# Patient Record
Sex: MALE | Race: WHITE | URBAN - METROPOLITAN AREA
[De-identification: names, ages, dates, MRNs, and addresses within clinical notes are randomized per-mention and may not be internally consistent; named-entity substitution may affect disease eponyms.]

---

## 2019-08-05 ENCOUNTER — APPOINTMENT (RX ONLY)
Dept: URBAN - METROPOLITAN AREA CLINIC 142 | Facility: CLINIC | Age: 79
Setting detail: DERMATOLOGY
End: 2019-08-05

## 2019-08-05 DIAGNOSIS — L57.0 ACTINIC KERATOSIS: ICD-10-CM

## 2019-08-05 DIAGNOSIS — Z85.820 PERSONAL HISTORY OF MALIGNANT MELANOMA OF SKIN: ICD-10-CM

## 2019-08-05 DIAGNOSIS — L81.4 OTHER MELANIN HYPERPIGMENTATION: ICD-10-CM

## 2019-08-05 DIAGNOSIS — D22 MELANOCYTIC NEVI: ICD-10-CM

## 2019-08-05 DIAGNOSIS — D18.0 HEMANGIOMA: ICD-10-CM

## 2019-08-05 DIAGNOSIS — L72.8 OTHER FOLLICULAR CYSTS OF THE SKIN AND SUBCUTANEOUS TISSUE: ICD-10-CM

## 2019-08-05 DIAGNOSIS — L82.1 OTHER SEBORRHEIC KERATOSIS: ICD-10-CM

## 2019-08-05 DIAGNOSIS — Z80.8 FAMILY HISTORY OF MALIGNANT NEOPLASM OF OTHER ORGANS OR SYSTEMS: ICD-10-CM

## 2019-08-05 PROBLEM — D22.5 MELANOCYTIC NEVI OF TRUNK: Status: ACTIVE | Noted: 2019-08-05

## 2019-08-05 PROBLEM — D22.62 MELANOCYTIC NEVI OF LEFT UPPER LIMB, INCLUDING SHOULDER: Status: ACTIVE | Noted: 2019-08-05

## 2019-08-05 PROBLEM — D18.01 HEMANGIOMA OF SKIN AND SUBCUTANEOUS TISSUE: Status: ACTIVE | Noted: 2019-08-05

## 2019-08-05 PROBLEM — F41.9 ANXIETY DISORDER, UNSPECIFIED: Status: ACTIVE | Noted: 2019-08-05

## 2019-08-05 PROBLEM — D22.61 MELANOCYTIC NEVI OF RIGHT UPPER LIMB, INCLUDING SHOULDER: Status: ACTIVE | Noted: 2019-08-05

## 2019-08-05 PROBLEM — F32.9 MAJOR DEPRESSIVE DISORDER, SINGLE EPISODE, UNSPECIFIED: Status: ACTIVE | Noted: 2019-08-05

## 2019-08-05 PROCEDURE — 17003 DESTRUCT PREMALG LES 2-14: CPT

## 2019-08-05 PROCEDURE — 99203 OFFICE O/P NEW LOW 30 MIN: CPT | Mod: 25

## 2019-08-05 PROCEDURE — 17000 DESTRUCT PREMALG LESION: CPT

## 2019-08-05 PROCEDURE — ? COUNSELING

## 2019-08-05 PROCEDURE — ? DEFER

## 2019-08-05 PROCEDURE — ? LIQUID NITROGEN

## 2019-08-05 ASSESSMENT — LOCATION SIMPLE DESCRIPTION DERM
LOCATION SIMPLE: LEFT FOREARM
LOCATION SIMPLE: RIGHT CHEEK
LOCATION SIMPLE: RIGHT EAR
LOCATION SIMPLE: LEFT CHEEK
LOCATION SIMPLE: RIGHT UPPER ARM
LOCATION SIMPLE: SCALP
LOCATION SIMPLE: RIGHT UPPER BACK
LOCATION SIMPLE: LEFT UPPER ARM
LOCATION SIMPLE: LEFT EAR
LOCATION SIMPLE: ABDOMEN
LOCATION SIMPLE: LEFT ANTERIOR NECK
LOCATION SIMPLE: LEFT UPPER BACK
LOCATION SIMPLE: CHEST
LOCATION SIMPLE: LEFT SHOULDER

## 2019-08-05 ASSESSMENT — LOCATION DETAILED DESCRIPTION DERM
LOCATION DETAILED: LEFT POSTERIOR SHOULDER
LOCATION DETAILED: LEFT SUPERIOR UPPER BACK
LOCATION DETAILED: LEFT SUPERIOR CRUS OF ANTIHELIX
LOCATION DETAILED: RIGHT CENTRAL MALAR CHEEK
LOCATION DETAILED: RIGHT MID-UPPER BACK
LOCATION DETAILED: LEFT LATERAL ABDOMEN
LOCATION DETAILED: LEFT INFERIOR CENTRAL MALAR CHEEK
LOCATION DETAILED: LEFT SUPERIOR PARIETAL SCALP
LOCATION DETAILED: RIGHT LATERAL INFERIOR CHEST
LOCATION DETAILED: RIGHT PROXIMAL POSTERIOR UPPER ARM
LOCATION DETAILED: LEFT MEDIAL SUPERIOR CHEST
LOCATION DETAILED: LEFT SUPERIOR LATERAL UPPER BACK
LOCATION DETAILED: LEFT SUPERIOR ANTERIOR NECK
LOCATION DETAILED: LEFT VENTRAL LATERAL DISTAL FOREARM
LOCATION DETAILED: RIGHT SUPERIOR CRUS OF ANTIHELIX
LOCATION DETAILED: LEFT DISTAL POSTERIOR UPPER ARM

## 2019-08-05 ASSESSMENT — LOCATION ZONE DERM
LOCATION ZONE: FACE
LOCATION ZONE: NECK
LOCATION ZONE: ARM
LOCATION ZONE: EAR
LOCATION ZONE: SCALP
LOCATION ZONE: TRUNK

## 2019-08-05 ASSESSMENT — PAIN INTENSITY VAS: HOW INTENSE IS YOUR PAIN 0 BEING NO PAIN, 10 BEING THE MOST SEVERE PAIN POSSIBLE?: NO PAIN

## 2019-08-05 NOTE — PROCEDURE: LIQUID NITROGEN
Post-Care Instructions: I reviewed with the patient in detail post-care instructions. Patient is to wear sunprotection, and avoid picking at any of the treated lesions. Pt may apply Vaseline to crusted or scabbing areas.
Consent: The patient's verbal consent was obtained including but not limited to risks of crusting, scabbing, blistering, scarring, darker or lighter pigmentary change, recurrence, incomplete removal and infection.
Detail Level: Zone
Render Post-Care Instructions In Note?: yes
Total Number Of Aks Treated: 14
Duration Of Freeze Thaw-Cycle (Seconds): 15
Number Of Freeze-Thaw Cycles: 1 freeze-thaw cycle
Render In Bullet Format When Appropriate: No

## 2022-01-21 ENCOUNTER — HOSPITAL ENCOUNTER (INPATIENT)
Dept: HOSPITAL 96 - M.ERS | Age: 82
LOS: 1 days | Discharge: HOME | DRG: 69 | End: 2022-01-22
Attending: INTERNAL MEDICINE | Admitting: INTERNAL MEDICINE
Payer: COMMERCIAL

## 2022-01-21 VITALS — SYSTOLIC BLOOD PRESSURE: 189 MMHG | DIASTOLIC BLOOD PRESSURE: 114 MMHG

## 2022-01-21 VITALS — HEIGHT: 75 IN | BODY MASS INDEX: 28.7 KG/M2 | WEIGHT: 230.8 LBS

## 2022-01-21 VITALS — SYSTOLIC BLOOD PRESSURE: 184 MMHG | DIASTOLIC BLOOD PRESSURE: 114 MMHG

## 2022-01-21 VITALS — SYSTOLIC BLOOD PRESSURE: 157 MMHG | DIASTOLIC BLOOD PRESSURE: 95 MMHG

## 2022-01-21 VITALS — DIASTOLIC BLOOD PRESSURE: 107 MMHG | SYSTOLIC BLOOD PRESSURE: 178 MMHG

## 2022-01-21 DIAGNOSIS — Z88.0: ICD-10-CM

## 2022-01-21 DIAGNOSIS — R29.810: ICD-10-CM

## 2022-01-21 DIAGNOSIS — Z79.01: ICD-10-CM

## 2022-01-21 DIAGNOSIS — I48.91: ICD-10-CM

## 2022-01-21 DIAGNOSIS — Z88.6: ICD-10-CM

## 2022-01-21 DIAGNOSIS — Z91.02: ICD-10-CM

## 2022-01-21 DIAGNOSIS — E44.1: ICD-10-CM

## 2022-01-21 DIAGNOSIS — G20: ICD-10-CM

## 2022-01-21 DIAGNOSIS — Z88.8: ICD-10-CM

## 2022-01-21 DIAGNOSIS — I95.1: ICD-10-CM

## 2022-01-21 DIAGNOSIS — Z20.822: ICD-10-CM

## 2022-01-21 DIAGNOSIS — Z66: ICD-10-CM

## 2022-01-21 DIAGNOSIS — Z95.0: ICD-10-CM

## 2022-01-21 DIAGNOSIS — Z90.49: ICD-10-CM

## 2022-01-21 DIAGNOSIS — G45.9: Primary | ICD-10-CM

## 2022-01-21 DIAGNOSIS — G90.9: ICD-10-CM

## 2022-01-21 LAB
ABSOLUTE BASOPHILS: 0 THOU/UL (ref 0–0.2)
ABSOLUTE EOSINOPHILS: 0 THOU/UL (ref 0–0.7)
ABSOLUTE MONOCYTES: 0.5 THOU/UL (ref 0–1.2)
ALBUMIN SERPL-MCNC: 3.1 G/DL (ref 3.4–5)
ALP SERPL-CCNC: 67 U/L (ref 46–116)
ALT SERPL-CCNC: 6 U/L (ref 30–65)
ANION GAP SERPL CALC-SCNC: 7 MMOL/L (ref 7–16)
AST SERPL-CCNC: 12 U/L (ref 15–37)
BASOPHILS NFR BLD AUTO: 0.8 %
BILIRUB SERPL-MCNC: 0.7 MG/DL
BUN SERPL-MCNC: 21 MG/DL (ref 7–18)
CALCIUM SERPL-MCNC: 8.6 MG/DL (ref 8.5–10.1)
CHLORIDE SERPL-SCNC: 108 MMOL/L (ref 98–107)
CO2 SERPL-SCNC: 28 MMOL/L (ref 21–32)
CREAT SERPL-MCNC: 1.2 MG/DL (ref 0.6–1.3)
EOSINOPHIL NFR BLD: 0.8 %
GLUCOSE SERPL-MCNC: 77 MG/DL (ref 70–99)
GRANULOCYTES NFR BLD MANUAL: 61.9 %
HCT VFR BLD CALC: 41.6 % (ref 42–52)
HGB BLD-MCNC: 13.5 GM/DL (ref 14–18)
INR PPP: 1.1
LYMPHOCYTES # BLD: 1.3 THOU/UL (ref 0.8–5.3)
LYMPHOCYTES NFR BLD AUTO: 26.2 %
MCH RBC QN AUTO: 30.2 PG (ref 26–34)
MCHC RBC AUTO-ENTMCNC: 32.5 G/DL (ref 28–37)
MCV RBC: 92.9 FL (ref 80–100)
MONOCYTES NFR BLD: 10.3 %
MPV: 6.9 FL. (ref 7.2–11.1)
NEUTROPHILS # BLD: 3.1 THOU/UL (ref 1.6–8.1)
NUCLEATED RBCS: 0 /100WBC
PLATELET COUNT*: 169 THOU/UL (ref 150–400)
POTASSIUM SERPL-SCNC: 4.3 MMOL/L (ref 3.5–5.1)
PROT SERPL-MCNC: 6.3 G/DL (ref 6.4–8.2)
PROTHROMBIN TIME: 10.9 SECONDS (ref 9.2–11.5)
RBC # BLD AUTO: 4.47 MIL/UL (ref 4.5–6)
RDW-CV: 14.3 % (ref 10.5–14.5)
SODIUM SERPL-SCNC: 143 MMOL/L (ref 136–145)
WBC # BLD AUTO: 5 THOU/UL (ref 4–11)

## 2022-01-21 NOTE — EKG
[place] [address]
Phone:  [phone]                     ELECTROCARDIOGRAM REPORT      
_______________________________________________________________________________
 
Name:         MERCEDEZ ARIAS            Room:                     PRE ER 
M.R.#:    [id number]     Account #:     [id number]  
Admission:                Attend Phys:                     
Discharge:                Date of Birth: [birth year]  
Date of Service: 22 0958  Report #:      [id number]
        [id number]
_______________________________________________________________________________
THIS REPORT FOR:  //name//                      
 
                         [facility] ED
                                       
Test Date:    2022               Test Time:    09:58:30
Pat Name:     MERCEDEZ ARIAS         Department:   
Patient ID:   [id number]            Room:          
Gender:                               Technician:   
:          1940               Requested By: Napoleon Louis
Order Number: [id number] MD:   Elia Newman
                                 Measurements
Intervals                              Axis          
Rate:         75                       P:            
MS:                                    QRS:          69
QRSD:         113                      T:            -22
QT:           426                                    
QTc:          476                                    
                           Interpretive Statements
Afib/flut and V-paced complexes
 
Anteroseptal infarct, age indeterminate
No previous ECG available for comparison
Electronically Signed On 2022 10:06:46 CST by Elia Newman
[email or web address]
 
 
 
 
 
 
 
 
 
 
 
 
 
 
 
 
 
 
 
 
  <ELECTRONICALLY SIGNED>
                                           By: Elia Newman MD, [facility]      
  22     1006
D: 22   _____________________________________
T: 22   Elia Newman MD, [facility]        /EPI

## 2022-01-21 NOTE — CON
[address]  [address]                    CONSULTATION                  
_______________________________________________________________________________
 
Name:       MERCEDEZ ARIAS             Room:           [address] [place]  
M.R.#:  [id number]      Account #:      [id number]  
Admission:  01/21/22     Attend Phys:    Kole Donaldson MD 
Discharge:               Date of Birth:  [birth year]  
         Report #: [id number]
                                                                     [id number]
_______________________________________________________________________________
THIS REPORT FOR:  
 
cc:  Tammie Yepez MD, Katrina MD Khosla, Parveen K. MD                                              ~
 
 
DATE OF CONSULTATION: 01/21/2022
 
HISTORY OF PRESENT ILLNESS:  This is an 81-year-old male patient who was seen by
[facility] earlier today for some facial weakness, which was noticed on 
the right side.  The patient said he is not having any more symptoms.  I 
reviewed those notes and it looks like they had recommended CT angio of the head
and neck and MRI.  Neither one of them was done, I am not sure why it was not 
done, I need to find out.  The patient said he is feeling back to his baseline. 
He in fact wanted to go back and wanted to know when he can go back.  
Apparently, there are some questions of hypotension, which may have been noted, 
but it is not documented.  Blood sugar was okay when it happened.
 
REVIEW OF SYSTEMS:  Indicates he has a history of AFib.  He is on 
anticoagulation.  There is some question of Parkinson disease and he is on 
carbidopa/levodopa.  I asked him about depression.  He denies it, but he is on 
Lexapro.  History is somewhat confusing.  He tells me he usually uses either a 
walker or a wheelchair.  I asked him why does he do that, he said he has 
multiple problems and he has done it for a long time.  This is a relevant 
14-point review of systems I can get.
 
PAST MEDICAL HISTORY:  Positive for Parkinson disease and ambulation difficulty.
 
FAMILY HISTORY:  Unremarkable.
 
SOCIAL HISTORY:  He says he does not drink alcohol or smoke.
 
PHYSICAL EXAMINATION:
NEUROLOGIC:  He talks softly, but he was able to tell me what month and what 
date it is and what hospital he is in.  His speech otherwise looks intact.  
Cranial nerve examination, there is some question of right facial weakness.  He 
moved all 4 extremities.  He is weak in the lower extremities and to some extent
in the upper extremity.  He says he can feel in the lower extremities.  The 
reflexes are somewhat diminished.  Plantars are mute.  There is no carotid bruit
in this patient.
CARDIORESPIRATORY:  Examination appear unremarkable.
HEENT:  His hearing and vision looks adequate.
VITAL SIGNS:  Blood pressure is 184/114, respirations 18, pulse is 77, 
temperature is 98.5.
 
 
 
 
[place] [address]                    CONSULTATION                  
_______________________________________________________________________________
 
Name:       MERCEDEZ ARIAS             Room:           [address] [place]  
[facility]#:  [id number]      Account #:      [id number]  
Admission:  01/21/22     Attend Phys:    Kole Donaldson MD 
Discharge:               Date of Birth:  [birth year]  
         Report #: [id number]
                                                                     [id number]
_______________________________________________________________________________
 
 
LABORATORY DATA:  Indicate white count is 5.  Sodium is 143.
 
IMPRESSION AND PLAN:  This patient with a chronic problem, was already seen by 
neurologist.  I do not know why CT angiogram was not done or whether it was 
done, the report is not here.  I will try to find out.  He has a pacemaker.  He 
does not know whether it is compatible with MRI or not, so I do not know MRI can
be done.  That may not change much treatment in this patient because he is 
already on Eliquis, but he will need some imaging study of the blood vessels.  
His GFR is good and the best I can tell, he is not allergic to dye, but he is 
allergic to bunch of other things.  We will try to find out and if there is a 
problem, we will get a carotid Doppler.  I spent more than 50 minutes of time 
taking care of this patient today and majority was spent counseling and 
coordinating.
 
 
 
 
 
 
 
 
 
 
 
 
 
 
 
 
 
 
 
 
 
 
 
 
 
 
 
 
 
                       
                                        By:                                
                 
D: 01/21/22 1956_______________________________________
T: 01/21/22 2032Pbarby Ruff MD            /nt

## 2022-01-21 NOTE — NUR
PER NURSE AT [facility] PT WAS IN BED ALL DAY YESTERDAY C/O OF WEAKNESS.
WHEN PT WOULD GET UP HIS B/P WOULD DROP. PT THIS AM GOT UP FOR BREAKFAST AND
HAS NOT STROKE SYMPTOMS. AT 0900 THE NURSE WENT INTO THE PT ROOM AND SHE
NOTICED FACIAL DROOPING. DR. LUTZ NOTIFIED.

## 2022-01-21 NOTE — 2DMMODE
[place] [address]
Phone:  [phone] 2 D/M-MODE ECHOCARDIOGRAM     
_______________________________________________________________________________
 
Name:         MERCEDEZ ARIAS            Room:          [place] [address]   [place] 
[facility]#:    [id number]     Account #:     [id number]  
Admission:    22    Attend Phys:   Kole Donaldson, 
Discharge:                Date of Birth: [birth year]  
Date of Service: 22 1647  Report #:      [id number]
        [id number]
_______________________________________________________________________________
THIS REPORT FOR:
 
cc:  Tammie Yepez MD, Katrina MD Blick,Elia BERMEO MD [facility]        
                                                                       ~
 
--------------- APPROVED REPORT --------------
 
 
Study performed:  2022 15:10:34
 
EXAM: Comprehensive 2D, Doppler, and color-flow 
Echocardiogram 
Patient Location: In-Patient   
Room #:  ER     Status:  routine
 
      BSA:         2.33
HR: 76 bpm BP:          146/87 mmHg 
Rhythm: NSR     
 
Other Information 
Study Quality: Good
 
Indications
CVA/TIA 
 
Echo Enhancing Agent
Indication: Rule out Shunt
Agent(s) / Amount(s) Used: Agitated Saline 10 cc
 
2D Dimensions
IVSd:  17.05 (7-11mm) LVOT Diam:  24.44 (18-24mm) 
LVDd:  45.51 mm  
PWd:  14.61 (7-11mm) Ascending Ao:  39.28 (22-36mm)
LVDs:  30.48 (25-40mm) 
Aortic Root:  39.45 mm 
 
Volumes
Left Atrial Volume (Systole) 
    LA ESV Index:  29.90 mL/m2
 
Aortic Valve
AoV Peak Rodri.:  0.93 m/s 
AO Peak Gr.:  3.45 mmHg  LVOT Max PG:  3.27 mmHg
AO Mean Gr.:  1.92 mmHg  LVOT Mean P.57 mmHg
 
 
[place] [address]
Phone:  [phone]                     2 D/M-MODE ECHOCARDIOGRAM     
_______________________________________________________________________________
 
Name:         ERIKPAMERCEDEZ PEREZ REANNA            Room:          [address] [place] 
[facility]#:    [id number]     Account #:     [id number]  
Admission:    22    Attend Phys:   Kole Donaldson, 
Discharge:                Date of Birth: [birth year]  
Date of Service: 22 1647  Report #:      [id number]
        [id number]
_______________________________________________________________________________
    LVOT Max V:  0.90 m/s
AO V2 VTI:  19.66 cm  LVOT Mean V:  0.57 m/s
CAYLA (VTI):  4.75 cm2  LVOT V1 VTI:  19.89 cm
 
TDI
 Medial E' Rodri.:  0.11 m/s
 Lateral E' Rodri.:  0.17 m/s
 
Pulmonary Valve
PV Peak Rodri.:  0.82 m/s PV Peak Gr.:  2.66 mmHg
 
Tricuspid Valve
    RAP Estimate:  5.00 mmHg
TR Peak Gr.:  27.62 mmHg RVSP:  32.00 mmHg
    PA Pressure:  32.00 mmHg
 
Left Ventricle
The left ventricle is normal size. There is normal LV segmental wall 
motion. Mild concentric left ventricular hypertrophy. Left 
ventricular systolic function is normal. The left ventricular 
ejection fraction is within the normal range. LVEF is 55-60%. This 
study is not technically sufficient to allow evaluation of the LV 
diastolic function due to atrial fibrillation.
 
Right Ventricle
The right ventricle is normal size. The right ventricular systolic 
function is normal. Pacemaker lead is present in the right ventricle. 
 
Atria
Left atrium is mildly dilated. The interatrial septum is intact with 
no evidence for an atrial septal defect. The right atrium size is 
normal.
 
Aortic Valve
The aortic valve is normal in structure. Mild aortic regurgitation. 
There is no aortic valvular stenosis.
 
Mitral Valve
There is mitral annular calcification. The mitral valve is normal in 
structure. Mild mitral regurgitation. No evidence of mitral valve 
stenosis.
 
Tricuspid Valve
The tricuspid valve is normal in structure. Trace tricuspid 
regurgitation. estimated pa pressure 30 mm Hg
 
 
 
[place] [address]
Phone:  [phone]                     2 D/M-MODE ECHOCARDIOGRAM     
_______________________________________________________________________________
 
Name:         MERCEDEZ ARIAS            Room:          [address] [place] 
[facility]#:    [id number]     Account #:     [id number]  
Admission:    22    Attend Phys:   Kole Donaldson, 
Discharge:                Date of Birth: [birth year]  
Date of Service: 22 1647  Report #:      [id number]
        [id number]
_______________________________________________________________________________
Pulmonic Valve
The pulmonary valve is normal in structure. Trace pulmonic 
regurgitation.
 
Great Vessels
Aortic root is mildly dilated.  IVC is normal in size and collapses 
>50% with inspiration.
 
Pericardium
There is no pericardial effusion.
 
<Conclusion>
Mild concentric left ventricular hypertrophy.
LVEF is 55-60%.
Left atrium is mildly dilated.
Mild aortic regurgitation.
Mild mitral regurgitation.
The interatrial septum is intact with no evidence for an atrial 
septal defect.
Aortic root is mildly dilated.
 
 
 
 
 
 
 
 
 
 
 
 
 
 
 
 
 
 
 
 
 
 
 
 
  <ELECTRONICALLY SIGNED>
                                           By: Elia Newman MD, [facility]      
  22
D: 22   _____________________________________
T: 22   Elia Newman MD, [facility]ACC        /INF

## 2022-01-22 VITALS — SYSTOLIC BLOOD PRESSURE: 138 MMHG | DIASTOLIC BLOOD PRESSURE: 92 MMHG

## 2022-01-22 VITALS — DIASTOLIC BLOOD PRESSURE: 110 MMHG | SYSTOLIC BLOOD PRESSURE: 170 MMHG

## 2022-01-22 VITALS — DIASTOLIC BLOOD PRESSURE: 58 MMHG | SYSTOLIC BLOOD PRESSURE: 101 MMHG

## 2022-01-22 VITALS — DIASTOLIC BLOOD PRESSURE: 79 MMHG | SYSTOLIC BLOOD PRESSURE: 127 MMHG

## 2022-01-22 VITALS — DIASTOLIC BLOOD PRESSURE: 95 MMHG | SYSTOLIC BLOOD PRESSURE: 145 MMHG

## 2022-01-22 VITALS — DIASTOLIC BLOOD PRESSURE: 72 MMHG | SYSTOLIC BLOOD PRESSURE: 121 MMHG

## 2022-01-22 LAB
ABSOLUTE BASOPHILS: 0 THOU/UL (ref 0–0.2)
ABSOLUTE MONOCYTES: 0.3 THOU/UL (ref 0–1.2)
ANION GAP SERPL CALC-SCNC: 4 MMOL/L (ref 7–16)
BASOPHILS NFR BLD AUTO: 1 %
BUN SERPL-MCNC: 16 MG/DL (ref 7–18)
CALCIUM SERPL-MCNC: 8.1 MG/DL (ref 8.5–10.1)
CHLORIDE SERPL-SCNC: 107 MMOL/L (ref 98–107)
CHOLEST SERPL-MCNC: 168 MG/DL (ref ?–200)
CO2 SERPL-SCNC: 31 MMOL/L (ref 21–32)
CREAT SERPL-MCNC: 1 MG/DL (ref 0.6–1.3)
EST. AVERAGE GLUCOSE BLD GHB EST-MCNC: 111 MG/DL
GLUCOSE SERPL-MCNC: 83 MG/DL (ref 70–99)
GLYCOHEMOGLOBIN (HGB A1C): 5.5 % (ref 4.8–5.6)
GRANULOCYTES NFR BLD MANUAL: 78 %
HCT VFR BLD CALC: 39.9 % (ref 42–52)
HDLC SERPL-MCNC: 63 MG/DL (ref 40–?)
HGB BLD-MCNC: 12.9 GM/DL (ref 14–18)
LDLC SERPL-MCNC: 89 MG/DL (ref ?–100)
LYMPHOCYTES # BLD: 0.6 THOU/UL (ref 0.8–5.3)
LYMPHOCYTES NFR BLD AUTO: 14 %
MCH RBC QN AUTO: 30.2 PG (ref 26–34)
MCHC RBC AUTO-ENTMCNC: 32.4 G/DL (ref 28–37)
MCV RBC: 93.3 FL (ref 80–100)
MONOCYTES NFR BLD: 7 %
MPV: 7 FL. (ref 7.2–11.1)
NEUTROPHILS # BLD: 3.1 THOU/UL (ref 1.6–8.1)
NUCLEATED RBCS: 0 /100WBC
PLATELET # BLD EST: ADEQUATE 10*3/UL
PLATELET COUNT*: 175 THOU/UL (ref 150–400)
POTASSIUM SERPL-SCNC: 4.2 MMOL/L (ref 3.5–5.1)
RBC # BLD AUTO: 4.28 MIL/UL (ref 4.5–6)
RDW-CV: 14.5 % (ref 10.5–14.5)
SODIUM SERPL-SCNC: 142 MMOL/L (ref 136–145)
TC:HDL: 2.7 RATIO
TRIGL SERPL-MCNC: 81 MG/DL (ref ?–150)
VLDLC SERPL CALC-MCNC: 16 MG/DL (ref ?–40)
WBC # BLD AUTO: 4 THOU/UL (ref 4–11)

## 2022-01-22 NOTE — NUR
T/C FROM DR. VIJAY RAMIREZ WITH NEW ORDERS FOR CAROTID DOPPLER AND START NS @
100ML/HR. DISCUSSED BEDSIDE SWALLOW & NIH SCORE OF 2, DOCTOR STATED OK TO TAKE
MEDS WHILE NPO
THIS NURSE DID BEDSIDE SWALLOW AT APPROXIMATELY 2100, PT PASSED WITH NO
COMPLICATIONS, NIH SCORE OF 2. SEE ACCOMPANIED CHARTING.